# Patient Record
Sex: FEMALE | URBAN - METROPOLITAN AREA
[De-identification: names, ages, dates, MRNs, and addresses within clinical notes are randomized per-mention and may not be internally consistent; named-entity substitution may affect disease eponyms.]

---

## 2024-09-24 ENCOUNTER — TELEPHONE ENCOUNTER (OUTPATIENT)
Dept: URBAN - METROPOLITAN AREA CLINIC 63 | Facility: CLINIC | Age: 44
End: 2024-09-24

## 2024-10-01 ENCOUNTER — OFFICE VISIT (OUTPATIENT)
Dept: URBAN - METROPOLITAN AREA CLINIC 9 | Facility: CLINIC | Age: 44
End: 2024-10-01

## 2024-11-12 ENCOUNTER — DASHBOARD ENCOUNTERS (OUTPATIENT)
Age: 44
End: 2024-11-12

## 2024-11-12 ENCOUNTER — OFFICE VISIT (OUTPATIENT)
Dept: URBAN - METROPOLITAN AREA CLINIC 9 | Facility: CLINIC | Age: 44
End: 2024-11-12
Payer: COMMERCIAL

## 2024-11-12 VITALS
WEIGHT: 166 LBS | DIASTOLIC BLOOD PRESSURE: 80 MMHG | HEIGHT: 65 IN | BODY MASS INDEX: 27.66 KG/M2 | SYSTOLIC BLOOD PRESSURE: 130 MMHG

## 2024-11-12 DIAGNOSIS — K64.4 EXTERNAL HEMORRHOID: ICD-10-CM

## 2024-11-12 DIAGNOSIS — K64.8 INTERNAL HEMORRHOID: ICD-10-CM

## 2024-11-12 DIAGNOSIS — K62.89 RECTAL PAIN: ICD-10-CM

## 2024-11-12 DIAGNOSIS — K59.00 CONSTIPATION, UNSPECIFIED CONSTIPATION TYPE: ICD-10-CM

## 2024-11-12 DIAGNOSIS — Z83.719 FAMILY HISTORY OF COLONIC POLYPS: ICD-10-CM

## 2024-11-12 DIAGNOSIS — Z12.11 COLON CANCER SCREENING: ICD-10-CM

## 2024-11-12 PROBLEM — 23913003: Status: ACTIVE | Noted: 2024-11-12

## 2024-11-12 PROCEDURE — 99204 OFFICE O/P NEW MOD 45 MIN: CPT | Performed by: STUDENT IN AN ORGANIZED HEALTH CARE EDUCATION/TRAINING PROGRAM

## 2024-11-12 RX ORDER — POLYETHYLENE GLYCOL 3350 17 G/17G
AS DIRECTED POWDER, FOR SOLUTION ORAL
OUTPATIENT

## 2024-11-12 NOTE — HPI-TODAY'S VISIT:
EGD:   Colonoscopy:   Imaging/Studies/Procedures:   - -   PMH:  external hemorrhoids s/p surgery in [facility] last year (2023) and has not had an exam since that time  Family Hx:   GI Hx: The patient came in with a primary complaint of discomfort and difficulty in bowel movements. This has been a persistent issue, occurring daily. The patient described the stool as hard, indicating possible constipation. The patient has a history of hemorrhoids and underwent surgery for the same in 2023. Despite the surgery, the patient continues to experience discomfort, particularly internally. This discomfort is causing difficulty in evacuating stool, further complicating the patient's condition. In an attempt to manage the symptoms, the patient has been resorting to home remedies. These include drinking anjel, milk, and eating bananas. However, these efforts have not resulted in any significant improvement in the patient's condition. Interestingly, the patient noted that the consumption of apple juice seems to exacerbate the condition. The patient does not possess any medical reports or records from the previous surgery or any other medical examinations such as a colonoscopy. This lack of documentation presents a challenge in understanding the full extent of the patient's condition and the effectiveness of the previous treatment.

## 2024-11-21 ENCOUNTER — OFFICE VISIT (OUTPATIENT)
Dept: URBAN - METROPOLITAN AREA SURGERY CENTER 9 | Facility: SURGERY CENTER | Age: 44
End: 2024-11-21

## 2024-11-21 RX ORDER — POLYETHYLENE GLYCOL 3350 17 G/17G
AS DIRECTED POWDER, FOR SOLUTION ORAL
Status: ACTIVE | COMMUNITY

## 2024-11-21 NOTE — HPI-TODAY'S VISIT:
EGD:   Colonoscopy:   Imaging/Studies/Procedures:   - -   PMH:  external hemorrhoids s/p surgery in [facility] last year (2023) and has not had an exam since that time  Family Hx:   GI Hx: The patient came in with a primary complaint of discomfort and difficulty in bowel movements. This has been a persistent issue, occurring daily. The patient described the stool as hard, indicating possible constipation. The patient has a history of hemorrhoids and underwent surgery for the same in 2023. Despite the surgery, the patient continues to experience discomfort, particularly internally. This discomfort is causing difficulty in evacuating stool, further complicating the patient's condition. In an attempt to manage the symptoms, the patient has been resorting to home remedies. These include drinking anjel, milk, and eating bananas. However, these efforts have not resulted in any significant improvement in the patient's condition. Interestingly, the patient noted that the consumption of apple juice seems to exacerbate the condition. The patient does not possess any medical reports or records from the previous surgery or any other medical examinations such as a colonoscopy. This lack of documentation presents a challenge in understanding the full extent of the patient's condition and the effectiveness of the previous treatment.

## 2024-12-12 ENCOUNTER — CLAIMS CREATED FROM THE CLAIM WINDOW (OUTPATIENT)
Dept: URBAN - METROPOLITAN AREA SURGERY CENTER 9 | Facility: SURGERY CENTER | Age: 44
End: 2024-12-12
Payer: COMMERCIAL

## 2024-12-12 DIAGNOSIS — K62.6 RECTAL ULCER: ICD-10-CM

## 2024-12-12 DIAGNOSIS — K62.89 OTHER SPECIFIED DISEASES OF ANUS AND RECTUM: ICD-10-CM

## 2024-12-12 DIAGNOSIS — K64.8 OTHER HEMORRHOIDS: ICD-10-CM

## 2024-12-12 PROCEDURE — 45331 SIGMOIDOSCOPY AND BIOPSY: CPT | Performed by: STUDENT IN AN ORGANIZED HEALTH CARE EDUCATION/TRAINING PROGRAM

## 2024-12-12 PROCEDURE — 00811 ANES LWR INTST NDSC NOS: CPT | Performed by: NURSE ANESTHETIST, CERTIFIED REGISTERED

## 2024-12-12 RX ORDER — POLYETHYLENE GLYCOL 3350 17 G/DOSE
AS DIRECTED POWDER (GRAM) ORAL
Status: ACTIVE | COMMUNITY

## 2024-12-12 NOTE — HPI-TODAY'S VISIT:
EGD:   Colonoscopy:   Imaging/Studies/Procedures:   - -   PMH:  external hemorrhoids s/p surgery in [facility] last year (2023) and has not had an exam since that time  Family Hx:   GI Hx: The patient came in with a primary complaint of discomfort and difficulty in bowel movements. This has been a persistent issue, occurring daily. The patient described the stool as hard, indicating possible constipation. The patient has a history of hemorrhoids and underwent surgery for the same in 2023. Despite the surgery, the patient continues to experience discomfort, particularly internally. This discomfort is causing difficulty in evacuating stool, further complicating the patient's condition. In an attempt to manage the symptoms, the patient has been resorting to home remedies. These include drinking anjel, milk, and eating bananas. However, these efforts have not resulted in any significant improvement in the patient's condition. Interestingly, the patient noted that the consumption of apple juice seems to exacerbate the condition. The patient does not possess any medical reports or records from the previous surgery or any other medical examinations such as a colonoscopy. This lack of documentation presents a challenge in understanding the full extent of the patient's condition and the effectiveness of the previous treatment.